# Patient Record
Sex: FEMALE | URBAN - METROPOLITAN AREA
[De-identification: names, ages, dates, MRNs, and addresses within clinical notes are randomized per-mention and may not be internally consistent; named-entity substitution may affect disease eponyms.]

---

## 2022-10-31 ENCOUNTER — LAB REQUISITION (OUTPATIENT)
Dept: LAB | Facility: CLINIC | Age: 27
End: 2022-10-31

## 2022-10-31 DIAGNOSIS — Z01.419 ENCOUNTER FOR GYNECOLOGICAL EXAMINATION (GENERAL) (ROUTINE) WITHOUT ABNORMAL FINDINGS: ICD-10-CM

## 2022-10-31 DIAGNOSIS — N92.6 IRREGULAR MENSTRUATION, UNSPECIFIED: ICD-10-CM

## 2022-10-31 LAB
CHOLEST SERPL-MCNC: 198 MG/DL
FASTING STATUS PATIENT QL REPORTED: NO
GLUCOSE SERPL-MCNC: 84 MG/DL (ref 70–99)
HBA1C MFR BLD: 5.3 %
HDLC SERPL-MCNC: 42 MG/DL
LDLC SERPL CALC-MCNC: 122 MG/DL
NONHDLC SERPL-MCNC: 156 MG/DL
TRIGL SERPL-MCNC: 172 MG/DL
TSH SERPL DL<=0.005 MIU/L-ACNC: 1.65 UIU/ML (ref 0.3–4.2)

## 2022-10-31 PROCEDURE — 80061 LIPID PANEL: CPT | Performed by: NURSE PRACTITIONER

## 2022-10-31 PROCEDURE — 82947 ASSAY GLUCOSE BLOOD QUANT: CPT | Performed by: NURSE PRACTITIONER

## 2022-10-31 PROCEDURE — 84443 ASSAY THYROID STIM HORMONE: CPT | Performed by: NURSE PRACTITIONER

## 2022-10-31 PROCEDURE — 83036 HEMOGLOBIN GLYCOSYLATED A1C: CPT | Performed by: NURSE PRACTITIONER

## 2025-03-04 ENCOUNTER — ANCILLARY PROCEDURE (OUTPATIENT)
Dept: GENERAL RADIOLOGY | Facility: CLINIC | Age: 30
End: 2025-03-04
Attending: PEDIATRICS
Payer: COMMERCIAL

## 2025-03-04 ENCOUNTER — OFFICE VISIT (OUTPATIENT)
Dept: ORTHOPEDICS | Facility: CLINIC | Age: 30
End: 2025-03-04
Payer: COMMERCIAL

## 2025-03-04 DIAGNOSIS — S60.031A CONTUSION OF RIGHT MIDDLE FINGER WITHOUT DAMAGE TO NAIL, INITIAL ENCOUNTER: ICD-10-CM

## 2025-03-04 DIAGNOSIS — S60.051A CONTUSION OF RIGHT LITTLE FINGER WITHOUT DAMAGE TO NAIL, INITIAL ENCOUNTER: ICD-10-CM

## 2025-03-04 DIAGNOSIS — S69.91XA HAND INJURY, RIGHT, INITIAL ENCOUNTER: ICD-10-CM

## 2025-03-04 DIAGNOSIS — S69.91XA HAND INJURY, RIGHT, INITIAL ENCOUNTER: Primary | ICD-10-CM

## 2025-03-04 PROCEDURE — 73130 X-RAY EXAM OF HAND: CPT | Mod: TC | Performed by: RADIOLOGY

## 2025-03-04 PROCEDURE — 99203 OFFICE O/P NEW LOW 30 MIN: CPT | Performed by: PEDIATRICS

## 2025-03-04 NOTE — Clinical Note
3/4/2025      Milla Chance  [id number] [facility] [address]      Dear Colleague,    Thank you for referring your patient, Milla Chance, to the [facility]. Please see a copy of my visit note below.    ASSESSMENT & PLAN    Diagnoses and all orders for this visit:    Hand injury, right, initial encounter      This issue is {ACUTE/CHRONIC:620713} and {IMPROVING WORSENIN}.      {FOLLOW UP PLANS (Optional):192380}    Bradly Bolton DO  [facility]    -----  No chief complaint on file.      SUBJECTIVE  Milla Chance is a/an 29 year old female who is seen as a WALK IN patient for evaluation of right hand.     The patient is seen by themselves.  The patient is Right handed    Onset: 3-4 day(s) ago. Patient describes injury as getting the hand slammed between a dumpster and metal shelving, backside getting hit with the metal shelving  Location of Pain: left hand, Middle finger mostly the issue, PIP, and 5th finger, diffuse 5th pain  Worsened by: Gripping, holding things  Better with:   Treatments tried: Tylenol  Associated symptoms: swelling    Orthopedic/Surgical history: Notes that she had a fx of the ring finger previously 10+ years ago  Social History/Occupation: PCA    **  Above information per rooming staff.  Additional history:  Pain is more around radial aspect long finger PIP joint and proximal, with focal swelling there.  Also pain and some discoloration along radial small finger.  Has tried some heat, no benefit.  Ring finger ok. No other hand pain.      REVIEW OF SYSTEMS:  Review of Systems    OBJECTIVE:      Hand/wrist (right):    Inspection:  No deformity noted.  No*** swelling. No*** ecchymosis.    Motion:  Forearm pronation ***, forearm supination ***.  Wrist flexion ***  Wrist extension ***  Wrist radial deviation ***  Wrist ulnar deviation ***  Digit motion ***    Strength:  ***    Sensation:  Grossly  intact ***.    Radial pulses normal, +2/4, capillary refill brisk.    Palpation:  Tender ***  Nontender ***    Tinel ***. Phalen ***.    Finkelstein ***.      RADIOLOGY:  Final results and radiologist's interpretation, available in the [facility] health record.  Images were reviewed with the patient in the office today.  My personal interpretation of the performed imaging: ***      {[facility] 2021 Documentation (Optional):969170}  {2021 E&M time (Optional):235938}  {Provider  Link to [facility] Help Grid :[id number]}         Again, thank you for allowing me to participate in the care of your patient.        Sincerely,        Bradly Bolton, DO    Electronically signed

## 2025-03-04 NOTE — PROGRESS NOTES
ASSESSMENT & PLAN    Milla was seen today for injury.    Diagnoses and all orders for this visit:    Hand injury, right, initial encounter  -     XR Hand Right G/E 3 Views; Future    Contusion of right middle finger without damage to nail, initial encounter    Contusion of right little finger without damage to nail, initial encounter      This issue is {ACUTE/CHRONIC:015184} and {IMPROVING WORSENIN}.      See Patient Instructions  Patient Instructions   Hand/finger injury most consistent with contusion.  No fractures noted in the long or small fingers on x-rays.  There is some irregularity at the volar base of the middle phalanx of the ring finger on the lateral view, consistent with remote volar plate injury.  Anticipate improvement with time.  May do icing/cool packs, over-the-counter medication if needed.  Reviewed support options, which would typically include ynes taping, splinting, or finger sleeves.  With reaction to adhesive, taping not done today.  Oval 8 and AlumaFoam splint use reviewed, may use for comfort/support with activities over the next few days to 1 week.  Otherwise, consider finger sleeves, which you can obtain elsewhere (such as online).  Monitor course 1 week.  If improving, follow-up is open-ended.  Otherwise, contact clinic for other questions or concerns.    If you have any further questions for your physician or physician s care team you can contact them thru MyChart or by calling [phone].      Bradly Bolton [facility]    -----  Chief Complaint   Patient presents with    Right Hand - Injury       SUBJECTIVE  Milla Chance is a/an 29 year old female who is seen as a WALK IN patient for evaluation of right hand.     The patient is seen by themselves.  The patient is Right handed    Onset: 3-4 day(s) ago. Patient describes injury as getting the hand slammed between a dumpster and metal shelving, backside getting hit with the  metal shelving  Location of Pain: left hand, Middle finger mostly the issue, PIP, and 5th finger, diffuse 5th pain  Worsened by: Gripping, holding things  Better with:   Treatments tried: Tylenol  Associated symptoms: swelling    Orthopedic/Surgical history: Notes that she had a fx of the ring finger previously 10+ years ago  Social History/Occupation: PCA    **  Above information per rooming staff.  Additional history:  Pain is more around radial aspect long finger PIP joint and proximal, with focal swelling there.  Also pain and some discoloration along radial small finger.  Has tried some heat, no benefit.  Ring finger ok. No other hand pain.      REVIEW OF SYSTEMS:  Review of Systems    OBJECTIVE:      Hand/wrist (right):    Inspection:  No deformity noted.  Mild long and small finger swelling. Mild dorsal small finger ecchymosis proximally and around PIP joint.    Motion:  Digit motion mod limitation composite flexion long and small fingers, with extension grossly intact; pain with testing    Strength:  deferred    Sensation:  Grossly intact ***.    Radial pulses normal, +2/4, capillary refill brisk.    Palpation:  Tender ***  Nontender ***    Tinel ***. Phalen ***.    Finkelstein ***.      RADIOLOGY:  Final results and radiologist's interpretation, available in the [facility] health record.  Images were reviewed with the patient in the office today.  My personal interpretation of the performed imaging: ***      {[facility] 2021 Documentation (Optional):559843}  {2021 E&M time (Optional):092937}  {Provider  Link to [facility] Help Grid :[id number]}      fragment, favor subacute to chronic although correlation point tenderness in this area is   recommended to exclude an acute fracture. Joint spaces are maintained.

## 2025-03-05 NOTE — PATIENT INSTRUCTIONS
Hand/finger injury most consistent with contusion.  No fractures noted in the long or small fingers on x-rays.  There is some irregularity at the volar base of the middle phalanx of the ring finger on the lateral view, consistent with remote volar plate injury.  Anticipate improvement with time.  May do icing/cool packs, over-the-counter medication if needed.  Reviewed support options, which would typically include ynes taping, splinting, or finger sleeves.  With reaction to adhesive, taping not done today.  Oval 8 and AlumaFoam splint use reviewed, may use for comfort/support with activities over the next few days to 1 week. Short alumafoam splint with ACE bandage wrap around hand, Middle, Ring and 5th fingers applied today. Otherwise, consider finger sleeves, which you can obtain elsewhere (such as online).  Monitor course 1 week.  If improving, follow-up is open-ended.  Otherwise, contact clinic for other questions or concerns.    If you have any further questions for your physician or physician s care team you can contact them thru [employer] or by calling [phone].